# Patient Record
Sex: FEMALE | Race: WHITE | URBAN - METROPOLITAN AREA
[De-identification: names, ages, dates, MRNs, and addresses within clinical notes are randomized per-mention and may not be internally consistent; named-entity substitution may affect disease eponyms.]

---

## 2017-06-09 PROBLEM — Z98.890 STATUS POST BUNIONECTOMY: Status: ACTIVE | Noted: 2017-06-09

## 2017-06-09 PROBLEM — N80.9 ENDOMETRIOSIS: Status: ACTIVE | Noted: 2017-06-09

## 2017-06-09 PROBLEM — S83.209A CURRENT TEAR OF SEMILUNAR CARTILAGE: Status: ACTIVE | Noted: 2017-06-09

## 2017-06-09 RX ORDER — SIMVASTATIN 10 MG
1 TABLET ORAL DAILY
COMMUNITY
Start: 2011-05-24 | End: 2017-12-22

## 2017-06-09 RX ORDER — SIMVASTATIN 10 MG
TABLET ORAL
Qty: 90 TABLET | Refills: 1 | Status: SHIPPED | OUTPATIENT
Start: 2017-06-09 | End: 2017-12-04

## 2017-06-19 ENCOUNTER — TELEPHONE (OUTPATIENT)
Dept: FAMILY MEDICINE CLINIC | Facility: CLINIC | Age: 57
End: 2017-06-19

## 2017-06-19 DIAGNOSIS — E78.5 DYSLIPIDEMIA: Primary | ICD-10-CM

## 2017-06-19 RX ORDER — EZETIMIBE 10 MG/1
10 TABLET ORAL DAILY
COMMUNITY
Start: 2007-06-09 | End: 2017-06-19

## 2017-06-20 RX ORDER — EZETIMIBE 10 MG/1
10 TABLET ORAL DAILY
Qty: 90 TABLET | Refills: 1 | Status: SHIPPED | OUTPATIENT
Start: 2017-06-20 | End: 2017-12-17

## 2017-06-24 ENCOUNTER — APPOINTMENT (OUTPATIENT)
Dept: LAB | Age: 57
End: 2017-06-24
Attending: FAMILY MEDICINE
Payer: COMMERCIAL

## 2017-06-24 DIAGNOSIS — E78.5 DYSLIPIDEMIA: ICD-10-CM

## 2017-06-24 PROCEDURE — 80061 LIPID PANEL: CPT | Performed by: FAMILY MEDICINE

## 2017-06-24 PROCEDURE — 36415 COLL VENOUS BLD VENIPUNCTURE: CPT | Performed by: FAMILY MEDICINE

## 2017-06-24 PROCEDURE — 80053 COMPREHEN METABOLIC PANEL: CPT | Performed by: FAMILY MEDICINE

## 2017-06-26 ENCOUNTER — TELEPHONE (OUTPATIENT)
Dept: FAMILY MEDICINE CLINIC | Facility: CLINIC | Age: 57
End: 2017-06-26

## 2017-06-26 NOTE — TELEPHONE ENCOUNTER
Pt advised of results- verbalized understanding. Pt wanted copies of lab work- they were printed and left at the  for her.

## 2017-06-26 NOTE — TELEPHONE ENCOUNTER
----- Message from Cristal Cordova MD sent at 6/25/2017  8:31 PM CDT -----  Total chol mildly high 223,   HDL excellent 113  Do not recommend change in meds  Continue to watch diet and exercise  May refill meds if needed x 6 months

## 2017-12-04 RX ORDER — ASPIRIN 81 MG/1
1 TABLET ORAL DAILY
COMMUNITY
Start: 2012-10-22

## 2017-12-04 RX ORDER — FERROUS SULFATE 325(65) MG
1 TABLET ORAL DAILY
COMMUNITY
Start: 2007-06-07

## 2017-12-04 RX ORDER — SIMVASTATIN 10 MG
TABLET ORAL
Qty: 90 TABLET | Refills: 0 | Status: SHIPPED | OUTPATIENT
Start: 2017-12-04 | End: 2018-04-11

## 2017-12-04 RX ORDER — LYSINE 500 MG
1 TABLET ORAL AS NEEDED
COMMUNITY
Start: 2006-12-04

## 2017-12-04 NOTE — TELEPHONE ENCOUNTER
Future appt:    Last Appointment:  Visit date not found    Cholesterol, Total (mg/dL)   Date Value   06/24/2017 223 (H)   ----------  HDL Cholesterol (mg/dL)   Date Value   06/24/2017 113   ----------  LDL Cholesterol (mg/dL)   Date Value   06/24/2017 89

## 2017-12-04 NOTE — TELEPHONE ENCOUNTER
Patient informed of refill and appt needed before next refill. Patient states that she called this morning and scheduled an appt.     Future Appointments  Date Time Provider Kendell Wadsworth   12/22/2017 9:30 AM Christiano Jerome MD [facility] [place] [facility]

## 2017-12-17 DIAGNOSIS — E78.5 DYSLIPIDEMIA: ICD-10-CM

## 2017-12-18 RX ORDER — EZETIMIBE 10 MG/1
TABLET ORAL
Qty: 90 TABLET | Refills: 1 | Status: SHIPPED | OUTPATIENT
Start: 2017-12-18 | End: 2018-07-09

## 2017-12-18 NOTE — TELEPHONE ENCOUNTER
Future appt:     Your appointments     Date & Time Appointment Department [facility])    Dec 22, 2017  9:30 AM CST Physical - Established Patient with Benedicto Miller MD [address], [place] [address] [place] [address]        Tracy Valdes

## 2017-12-22 ENCOUNTER — OFFICE VISIT (OUTPATIENT)
Dept: FAMILY MEDICINE CLINIC | Facility: CLINIC | Age: 57
End: 2017-12-22

## 2017-12-22 ENCOUNTER — APPOINTMENT (OUTPATIENT)
Dept: LAB | Age: 57
End: 2017-12-22
Attending: FAMILY MEDICINE
Payer: COMMERCIAL

## 2017-12-22 VITALS
RESPIRATION RATE: 16 BRPM | HEART RATE: 80 BPM | WEIGHT: 157 LBS | BODY MASS INDEX: 29.64 KG/M2 | TEMPERATURE: 99 F | DIASTOLIC BLOOD PRESSURE: 84 MMHG | HEIGHT: 61 IN | SYSTOLIC BLOOD PRESSURE: 124 MMHG

## 2017-12-22 DIAGNOSIS — Z00.00 HEALTH CARE MAINTENANCE: Primary | ICD-10-CM

## 2017-12-22 DIAGNOSIS — M54.31 SCIATICA OF RIGHT SIDE: ICD-10-CM

## 2017-12-22 DIAGNOSIS — E78.5 DYSLIPIDEMIA: ICD-10-CM

## 2017-12-22 PROCEDURE — 80050 GENERAL HEALTH PANEL: CPT | Performed by: FAMILY MEDICINE

## 2017-12-22 PROCEDURE — 88175 CYTOPATH C/V AUTO FLUID REDO: CPT | Performed by: FAMILY MEDICINE

## 2017-12-22 PROCEDURE — 99396 PREV VISIT EST AGE 40-64: CPT | Performed by: FAMILY MEDICINE

## 2017-12-22 PROCEDURE — 80061 LIPID PANEL: CPT | Performed by: FAMILY MEDICINE

## 2017-12-22 PROCEDURE — 36415 COLL VENOUS BLD VENIPUNCTURE: CPT | Performed by: FAMILY MEDICINE

## 2017-12-22 RX ORDER — RIBOFLAVIN (VITAMIN B2) 100 MG
100 TABLET ORAL DAILY
COMMUNITY

## 2017-12-22 RX ORDER — B-COMPLEX WITH VITAMIN C
TABLET ORAL DAILY
COMMUNITY

## 2017-12-22 NOTE — PATIENT INSTRUCTIONS
Await labs  Renew meds as needed  Consider PT for back issues for strengthening and stretching exercises

## 2017-12-23 NOTE — PROGRESS NOTES
[facility] [place]  PROGRESS NOTE  Chief Complaint:   Patient presents with:  Physical: Last pap was 10-22-12; pt is fasting       HPI:   This is a 56year old female coming in for yearly physical.  Patient only chronic medical problem has been -ASSAY, THYROID STIM HORMONE   Result Value Ref Range   TSH 0.887 0.350 - 5.500 mIU/mL   -CBC W/ DIFFERENTIAL   Result Value Ref Range   WBC 5.4 4.0 - 13.0 x10(3) uL   RBC 5.00 3.80 - 5.10 x10(6)uL   HGB 14.6 12.0 - 16.0 g/dL   HCT 45.2 34.0 - 50.0 %   P MOUTH DAILY Disp: 90 tablet Rfl: 1   SIMVASTATIN 10 MG Oral Tab TAKE 1 TABLET BY MOUTH EVERY DAY Disp: 90 tablet Rfl: 0   aspirin (ASPIR-LOW) 81 MG Oral Tab EC Take 1 tablet by mouth daily.  Disp:  Rfl:    Ferrous Sulfate (FERROUSUL) 325 (65 Fe) MG Oral Tab 157 lb. Vital signs reviewed. Appears stated age, well groomed. Physical Exam  GEN:  Patient is alert, awake and oriented, well developed, well nourished, no apparent distress.   HEENT:  Head:  Normocephalic, atraumatic Eyes: EOMI, PERRLA, no scleral icte side    A/P: Patient here for yearly physical and follow-up of her dyslipidemia. Patient recently had a mild back strain is working with a chiropractor. I will give consideration to referral to physical therapy for stretching and strengthening exercises.

## 2017-12-26 ENCOUNTER — TELEPHONE (OUTPATIENT)
Dept: FAMILY MEDICINE CLINIC | Facility: CLINIC | Age: 57
End: 2017-12-26

## 2017-12-26 DIAGNOSIS — E78.49 FAMILIAL MULTIPLE LIPOPROTEIN-TYPE HYPERLIPIDEMIA: ICD-10-CM

## 2017-12-26 DIAGNOSIS — E78.5 DYSLIPIDEMIA: Primary | ICD-10-CM

## 2017-12-26 NOTE — TELEPHONE ENCOUNTER
Patient notified of results and recommendations and expressed understanding.   Orders in for repeat labs in 6 months    Romario Madera, 12/26/17, 9:56 AM

## 2017-12-26 NOTE — TELEPHONE ENCOUNTER
----- Message from April Jensen MD sent at 12/23/2017  7:49 PM CST -----  Labs all good  No changes to meds  Recheck 6 months CMP, lipid

## 2017-12-27 ENCOUNTER — TELEPHONE (OUTPATIENT)
Dept: FAMILY MEDICINE CLINIC | Facility: CLINIC | Age: 57
End: 2017-12-27

## 2017-12-27 NOTE — TELEPHONE ENCOUNTER
----- Message from Danial Gosselin, MD sent at 12/27/2017  1:51 PM CST -----  Pap normal  Repeat 5 years

## 2018-04-11 RX ORDER — SIMVASTATIN 10 MG
TABLET ORAL
Qty: 90 TABLET | Refills: 1 | Status: SHIPPED | OUTPATIENT
Start: 2018-04-11 | End: 2018-10-09

## 2018-04-11 NOTE — TELEPHONE ENCOUNTER
Future appt:    Last Appointment:  12/22/2017    Cholesterol, Total (mg/dL)   Date Value   12/22/2017 180   ----------  HDL Cholesterol (mg/dL)   Date Value   12/22/2017 108   ----------  LDL Cholesterol (mg/dL)   Date Value   12/22/2017 59   ----------  T

## 2018-07-09 DIAGNOSIS — E78.5 DYSLIPIDEMIA: ICD-10-CM

## 2018-07-09 RX ORDER — EZETIMIBE 10 MG/1
TABLET ORAL
Qty: 90 TABLET | Refills: 1 | Status: SHIPPED | OUTPATIENT
Start: 2018-07-09 | End: 2019-01-14

## 2018-07-09 NOTE — TELEPHONE ENCOUNTER
Future appt:     Last Appointment:  12/22/2017; Return in about 6 months (around 6/22/2018).        Cholesterol, Total (mg/dL)   Date Value   12/22/2017 180   ----------  HDL Cholesterol (mg/dL)   Date Value   12/22/2017 108   ----------  LDL Cholesterol (m

## 2018-07-09 NOTE — TELEPHONE ENCOUNTER
Pt informed, appt given.     Future Appointments  Date Time Provider Kendell Wadsworth   7/14/2018 9:30 AM [facility] [place] [facility]

## 2018-07-10 ENCOUNTER — TELEPHONE (OUTPATIENT)
Dept: FAMILY MEDICINE CLINIC | Facility: CLINIC | Age: 58
End: 2018-07-10

## 2018-07-10 NOTE — TELEPHONE ENCOUNTER
Pt states that she has left foot pain that comes and goes. She states that the episodes are starting to last for a longer length of time when they occur. She states that the pain is on the top of the foot. She denies any swelling, redness.   She did elevate

## 2018-07-11 NOTE — TELEPHONE ENCOUNTER
Pt notified and verbalized understanding. Pt offered appt with first available provider but chose to schedule with Dr. Nellie Land. Appt scheduled. Pt advised to call back to be seen sooner if pain increases or any other new sx arise.     Future Appointments  D

## 2018-07-14 ENCOUNTER — APPOINTMENT (OUTPATIENT)
Dept: LAB | Age: 58
End: 2018-07-14
Attending: FAMILY MEDICINE
Payer: COMMERCIAL

## 2018-07-14 DIAGNOSIS — E78.49 FAMILIAL MULTIPLE LIPOPROTEIN-TYPE HYPERLIPIDEMIA: ICD-10-CM

## 2018-07-14 DIAGNOSIS — E78.5 DYSLIPIDEMIA: ICD-10-CM

## 2018-07-14 LAB
ALBUMIN SERPL-MCNC: 3.9 G/DL (ref 3.5–4.8)
ALP LIVER SERPL-CCNC: 83 U/L (ref 46–118)
ALT SERPL-CCNC: 40 U/L (ref 14–54)
AST SERPL-CCNC: 19 U/L (ref 15–41)
BILIRUB SERPL-MCNC: 0.6 MG/DL (ref 0.1–2)
BUN BLD-MCNC: 14 MG/DL (ref 8–20)
CALCIUM BLD-MCNC: 9.3 MG/DL (ref 8.3–10.3)
CHLORIDE: 103 MMOL/L (ref 101–111)
CHOLEST SMN-MCNC: 171 MG/DL (ref ?–200)
CO2: 29 MMOL/L (ref 22–32)
CREAT BLD-MCNC: 0.78 MG/DL (ref 0.55–1.02)
GLUCOSE BLD-MCNC: 96 MG/DL (ref 70–99)
HDLC SERPL-MCNC: 102 MG/DL (ref 45–?)
HDLC SERPL: 1.68 {RATIO} (ref ?–4.44)
LDLC SERPL CALC-MCNC: 50 MG/DL (ref ?–130)
M PROTEIN MFR SERPL ELPH: 7.3 G/DL (ref 6.1–8.3)
NONHDLC SERPL-MCNC: 69 MG/DL (ref ?–130)
POTASSIUM SERPL-SCNC: 4.4 MMOL/L (ref 3.6–5.1)
SODIUM SERPL-SCNC: 138 MMOL/L (ref 136–144)
TRIGL SERPL-MCNC: 96 MG/DL (ref ?–150)
VLDLC SERPL CALC-MCNC: 19 MG/DL (ref 5–40)

## 2018-07-14 PROCEDURE — 36415 COLL VENOUS BLD VENIPUNCTURE: CPT | Performed by: FAMILY MEDICINE

## 2018-07-14 PROCEDURE — 80061 LIPID PANEL: CPT | Performed by: FAMILY MEDICINE

## 2018-07-14 PROCEDURE — 80053 COMPREHEN METABOLIC PANEL: CPT | Performed by: FAMILY MEDICINE

## 2018-07-16 ENCOUNTER — TELEPHONE (OUTPATIENT)
Dept: FAMILY MEDICINE CLINIC | Facility: CLINIC | Age: 58
End: 2018-07-16

## 2018-07-16 NOTE — TELEPHONE ENCOUNTER
----- Message from Shay García MD sent at 7/14/2018  2:58 PM CDT -----  Labs great  Chol under good control  No changes in  meds  Recheck 6 months with physical

## 2018-07-23 ENCOUNTER — OFFICE VISIT (OUTPATIENT)
Dept: FAMILY MEDICINE CLINIC | Facility: CLINIC | Age: 58
End: 2018-07-23
Payer: COMMERCIAL

## 2018-07-23 VITALS
OXYGEN SATURATION: 94 % | SYSTOLIC BLOOD PRESSURE: 112 MMHG | WEIGHT: 166 LBS | BODY MASS INDEX: 31.34 KG/M2 | HEART RATE: 86 BPM | HEIGHT: 61 IN | TEMPERATURE: 99 F | DIASTOLIC BLOOD PRESSURE: 70 MMHG | RESPIRATION RATE: 16 BRPM

## 2018-07-23 DIAGNOSIS — M77.50 TENDONITIS OF ANKLE OR FOOT: Primary | ICD-10-CM

## 2018-07-23 PROBLEM — S83.209A CURRENT TEAR OF SEMILUNAR CARTILAGE: Status: RESOLVED | Noted: 2017-06-09 | Resolved: 2018-07-23

## 2018-07-23 PROBLEM — M54.31 SCIATICA OF RIGHT SIDE: Status: RESOLVED | Noted: 2017-12-22 | Resolved: 2018-07-23

## 2018-07-23 PROBLEM — N80.9 ENDOMETRIOSIS: Status: RESOLVED | Noted: 2017-06-09 | Resolved: 2018-07-23

## 2018-07-23 PROCEDURE — 99214 OFFICE O/P EST MOD 30 MIN: CPT | Performed by: FAMILY MEDICINE

## 2018-07-23 RX ORDER — NAPROXEN 500 MG/1
500 TABLET ORAL 2 TIMES DAILY WITH MEALS
Qty: 30 TABLET | Refills: 1 | Status: SHIPPED | OUTPATIENT
Start: 2018-07-23 | End: 2019-02-18

## 2018-07-23 NOTE — PATIENT INSTRUCTIONS
Out of dress shoes x 2 weeks  Ice to top of foot if pain  DO NOT LACE SHOES tight  Take Naprosen x 2 weeks  If persist will refer to Dr Paloma Farley

## 2018-07-25 PROBLEM — M77.50 TENDONITIS OF ANKLE OR FOOT: Status: ACTIVE | Noted: 2018-07-25

## 2018-07-25 NOTE — PROGRESS NOTES
[address]  PROGRESS NOTE  Chief Complaint:   Patient presents with:   Foot Pain: Pain on top of the left foot      HPI:   This is a 66year old female coming in for evaluation of pain to the top of the left foot for over a months riri mg/dL       Past Medical History:   Diagnosis Date   • Hyperlipidemia      Past Surgical History:  : CORRECT BUNION,SIMPLE Bilateral  No date: D & C  No date: KNEE ARTHROSCOPY Right  No date:   No date: OTHER SURGICAL HISTORY  Social History:  Smok dryness. CARDIOVASCULAR:  Denies chest pain, chest pressure, chest discomfort, palpitations, edema, dyspnea on exertion or at rest.  RESPIRATORY:  Denies shortness of breath, wheezing, cough or sputum.   GASTROINTESTINAL:  Denies abdominal pain, nausea, vo rales/rhonchi/wheezing. CHEST: No tenderness. ABDOMEN:  Soft, nondistended, nontender,, no masses, no hepatosplenomegaly. BACK: No tenderness, no spasm,  . EXTREMITIES: , no cyanosis, no clubbing, FROM, 2+ dorsalis pedis pulses bilaterally. ,  No bruisi Outcome: Patient verbalizes understanding. Patient is notified to call with any questions, complications, allergies, or worsening or changing symptoms. Patient is to call with any side effects or complications from the treatments as a result of today.

## 2018-08-06 ENCOUNTER — TELEPHONE (OUTPATIENT)
Dept: FAMILY MEDICINE CLINIC | Facility: CLINIC | Age: 58
End: 2018-08-06

## 2018-10-09 RX ORDER — SIMVASTATIN 10 MG
TABLET ORAL
Qty: 90 TABLET | Refills: 1 | Status: SHIPPED | OUTPATIENT
Start: 2018-10-09 | End: 2019-04-22

## 2018-10-09 NOTE — TELEPHONE ENCOUNTER
Future appt:    Last Appointment:  7/23/2018; No f/u recommended    Cholesterol, Total (mg/dL)   Date Value   07/14/2018 171     HDL Cholesterol (mg/dL)   Date Value   07/14/2018 102     LDL Cholesterol (mg/dL)   Date Value   07/14/2018 50     Triglyceride

## 2019-01-14 DIAGNOSIS — E78.5 DYSLIPIDEMIA: ICD-10-CM

## 2019-01-14 RX ORDER — EZETIMIBE 10 MG/1
TABLET ORAL
Qty: 90 TABLET | Refills: 1 | Status: SHIPPED | OUTPATIENT
Start: 2019-01-14 | End: 2019-08-21

## 2019-02-18 ENCOUNTER — OFFICE VISIT (OUTPATIENT)
Dept: FAMILY MEDICINE CLINIC | Facility: CLINIC | Age: 59
End: 2019-02-18
Payer: COMMERCIAL

## 2019-02-18 ENCOUNTER — HOSPITAL ENCOUNTER (OUTPATIENT)
Dept: GENERAL RADIOLOGY | Age: 59
Discharge: HOME | End: 2019-02-18
Attending: NURSE PRACTITIONER
Payer: COMMERCIAL

## 2019-02-18 VITALS
BODY MASS INDEX: 30.96 KG/M2 | DIASTOLIC BLOOD PRESSURE: 86 MMHG | HEART RATE: 85 BPM | HEIGHT: 61 IN | SYSTOLIC BLOOD PRESSURE: 132 MMHG | TEMPERATURE: 98 F | WEIGHT: 164 LBS | OXYGEN SATURATION: 98 %

## 2019-02-18 DIAGNOSIS — M67.40 GANGLION CYST: ICD-10-CM

## 2019-02-18 DIAGNOSIS — M79.672 LEFT FOOT PAIN: ICD-10-CM

## 2019-02-18 DIAGNOSIS — M79.672 LEFT FOOT PAIN: Primary | ICD-10-CM

## 2019-02-18 PROCEDURE — 99213 OFFICE O/P EST LOW 20 MIN: CPT | Performed by: NURSE PRACTITIONER

## 2019-02-18 PROCEDURE — 73630 X-RAY EXAM OF FOOT: CPT | Performed by: NURSE PRACTITIONER

## 2019-02-18 NOTE — PROGRESS NOTES
Bev Will is a 64year old female.   Patient presents with:  Cyst      HPI:   Complaints of bump to left foot- Wed (2/13)- unsure if there was any injury    Doesn't hurt to touch- but when she had shoes that were bothering her from rubbing- states ther GI: denies abdominal pain, nausea, vomiting, diarrhea   MUSCULOSKELETAL:  See HPI   NEURO: denies numbness or tingling     EXAM:   /86 (BP Location: Right arm, Patient Position: Sitting, Cuff Size: large)   Pulse 85   Temp 98.2 °F (36.8 °C) (Tympanic Ganglions often form with no symptoms. But the ganglion may put pressure on the nerves and the overlying skin. This can cause tingling, numbness, or pain. Ganglions sometimes swell. Their size can change with different activities or a change in weather.   H Surgery may be needed if a ganglion is causing ongoing or severe pain. The entire ganglion wall is removed during the procedure. Some nearby tissue may also be removed.   After surgery  You may feel pain, swelling, numbness, or tingling for several weeks fo

## 2019-02-18 NOTE — PATIENT INSTRUCTIONS
Follow-up with Dr. Gunner Mora for further evaluation bring your x-rays with you. Ganglion Cyst: Foot  A ganglion is a fluid-filled swelling of the lining of a joint or tendon. Ganglions can form on any part of the foot.  But they most often appear on the ankle o How are ganglions treated? Ganglions may be hard to treat without surgery. But nonsurgical methods may be helpful in relieving some of your symptoms. Nonsurgical care  · Pads placed around the ganglion can ease pressure and friction.   · Fluid removal may

## 2019-04-22 RX ORDER — SIMVASTATIN 10 MG
TABLET ORAL
Qty: 90 TABLET | Refills: 1 | Status: SHIPPED | OUTPATIENT
Start: 2019-04-22 | End: 2019-10-17

## 2019-04-22 NOTE — TELEPHONE ENCOUNTER
Future appt:     Your appointments     Date & Time Appointment Department [facility])    Apr 26, 2019  3:00 PM CDT Presurgical with Luis Holder MD [address] [facility], [facility],

## 2019-04-26 ENCOUNTER — OFFICE VISIT (OUTPATIENT)
Dept: FAMILY MEDICINE CLINIC | Facility: CLINIC | Age: 59
End: 2019-04-26
Payer: COMMERCIAL

## 2019-04-26 VITALS
HEIGHT: 61 IN | WEIGHT: 162.38 LBS | DIASTOLIC BLOOD PRESSURE: 72 MMHG | TEMPERATURE: 99 F | OXYGEN SATURATION: 96 % | RESPIRATION RATE: 18 BRPM | HEART RATE: 92 BPM | SYSTOLIC BLOOD PRESSURE: 138 MMHG | BODY MASS INDEX: 30.66 KG/M2

## 2019-04-26 DIAGNOSIS — M67.40 GANGLION CYST: Primary | ICD-10-CM

## 2019-04-26 DIAGNOSIS — Z01.818 PREOP EXAMINATION: ICD-10-CM

## 2019-04-26 PROCEDURE — 93000 ELECTROCARDIOGRAM COMPLETE: CPT | Performed by: FAMILY MEDICINE

## 2019-04-26 PROCEDURE — 80053 COMPREHEN METABOLIC PANEL: CPT | Performed by: FAMILY MEDICINE

## 2019-04-26 PROCEDURE — 99243 OFF/OP CNSLTJ NEW/EST LOW 30: CPT | Performed by: FAMILY MEDICINE

## 2019-04-26 PROCEDURE — 85025 COMPLETE CBC W/AUTO DIFF WBC: CPT | Performed by: FAMILY MEDICINE

## 2019-04-26 NOTE — PROGRESS NOTES
[facility] [place]  PROGRESS NOTE  Chief Complaint:   Patient presents with:  Pre-Op Exam: 5/14 L foot surgery      HPI:   This is a 64year old female coming in for preoperative clearance for upcoming left foot surgery on the 14th to remove a c vitamin supplement but can stop these for the surgery. Patient's past medical history is notable for difficult with elevated cholesterol which is the only prescribed medications that she takes.   Patient does have some mild seasonal allergies but it is a Comment:Some red dyes in food.   Current Meds:    Current Outpatient Medications:  SIMVASTATIN 10 MG Oral Tab TAKE 1 TABLET BY MOUTH EVERY DAY Disp: 90 tablet Rfl: 1   EZETIMIBE 10 MG Oral Tab TAKE 1 TABLET(10 MG) BY MOUTH DAILY Disp: 90 tablet Rfl: 1   Mul hives, eczema or rhinitis.      EXAM:   /72 (BP Location: Left arm, Patient Position: Sitting, Cuff Size: adult)   Pulse 92   Temp 98.5 °F (36.9 °C) (Temporal)   Resp 18   Ht 61\"   Wt 162 lb 6.4 oz   SpO2 96%   BMI 30.69 kg/m²  Estimated body mass in CBC WITH DIFFERENTIAL WITH PLATELET; Future  -     COMP METABOLIC PANEL (14);  Future  -     CBC WITH DIFFERENTIAL WITH PLATELET  -     COMP METABOLIC PANEL (14)  -     CBC W/ DIFFERENTIAL    Preop examination  -     ELECTROCARDIOGRAM, COMPLETE  -     CBC W MD  4/26/2019  4:49 PM    This note was created utilizing Dragon speech recognition software.  Please excuse any grammatical errors. Call my office if you have any questions regarding this note.

## 2019-04-26 NOTE — PATIENT INSTRUCTIONS
CLEAR FOR SURGERY  No Advil, ibuprofen, aspirin, Aleve or Motrin for 1 week before surgery  Stop all viatmins, herbal supplements  For 1 week before surgery

## 2019-04-29 ENCOUNTER — TELEPHONE (OUTPATIENT)
Dept: FAMILY MEDICINE CLINIC | Facility: CLINIC | Age: 59
End: 2019-04-29

## 2019-04-29 NOTE — TELEPHONE ENCOUNTER
Informed pt of her blood work results. Informed pt that she is cleared for surgery    Faxed paperwork to [employer].

## 2019-04-29 NOTE — TELEPHONE ENCOUNTER
----- Message from Danial Gosselin, MD sent at 4/28/2019  5:35 PM CDT -----  Labs great, was not fasting  CLEAR FOR SURGERY  Please forward labs, EKG and preop visit for surgery

## 2019-05-10 ENCOUNTER — TELEPHONE (OUTPATIENT)
Dept: FAMILY MEDICINE CLINIC | Facility: CLINIC | Age: 59
End: 2019-05-10

## 2019-08-21 DIAGNOSIS — E78.5 DYSLIPIDEMIA: ICD-10-CM

## 2019-08-21 RX ORDER — EZETIMIBE 10 MG/1
TABLET ORAL
Qty: 90 TABLET | Refills: 1 | Status: SHIPPED | OUTPATIENT
Start: 2019-08-21 | End: 2020-03-04

## 2019-08-21 NOTE — TELEPHONE ENCOUNTER
Future appt:    Last Appointment with provider:   4/26/2019; No f/u recommended    Last appointment at [facility]:  4/26/2019  Cholesterol, Total (mg/dL)   Date Value   07/14/2018 171     HDL Cholesterol (mg/dL)   Date Value   07/14/2018 102     LDL Chol

## 2019-08-26 ENCOUNTER — TELEPHONE (OUTPATIENT)
Dept: FAMILY MEDICINE CLINIC | Facility: CLINIC | Age: 59
End: 2019-08-26

## 2019-08-26 DIAGNOSIS — Z00.00 HEALTH CARE MAINTENANCE: ICD-10-CM

## 2019-08-26 DIAGNOSIS — E78.49 FAMILIAL MULTIPLE LIPOPROTEIN-TYPE HYPERLIPIDEMIA: Primary | ICD-10-CM

## 2019-08-26 NOTE — TELEPHONE ENCOUNTER
Patient requested an appt with Dr Samuel Valderrama for a px and also to discuss the switch of pcp.  Informed patient that Dr Samuel Valderrama is fully booked and offered a different Dr. Patient states she wants to discuss that with Dr Samuel Valderrama and requested a call back from n

## 2019-08-27 NOTE — TELEPHONE ENCOUNTER
Pt would like to schedule px with pap and discuss which pcp would be the best fit for her. There are no 45 minute appts available but some 30 minute appts. Please advise if we can fit pt in.

## 2019-08-27 NOTE — TELEPHONE ENCOUNTER
Left detailed message pt has an appt on 9/13 at 3:00. Also instructed pt to have her blood work done before her appt orders have been placed.

## 2019-08-29 ENCOUNTER — TELEPHONE (OUTPATIENT)
Dept: FAMILY MEDICINE CLINIC | Facility: CLINIC | Age: 59
End: 2019-08-29

## 2019-08-29 NOTE — TELEPHONE ENCOUNTER
Left message for pt to call the office in regards to the appt for her physical and a possible earlier date.

## 2019-08-30 ENCOUNTER — OFFICE VISIT (OUTPATIENT)
Dept: FAMILY MEDICINE CLINIC | Facility: CLINIC | Age: 59
End: 2019-08-30
Payer: COMMERCIAL

## 2019-08-30 VITALS
SYSTOLIC BLOOD PRESSURE: 128 MMHG | HEIGHT: 61 IN | RESPIRATION RATE: 16 BRPM | BODY MASS INDEX: 31.34 KG/M2 | DIASTOLIC BLOOD PRESSURE: 78 MMHG | OXYGEN SATURATION: 98 % | WEIGHT: 166 LBS | HEART RATE: 84 BPM | TEMPERATURE: 98 F

## 2019-08-30 DIAGNOSIS — E78.5 DYSLIPIDEMIA: ICD-10-CM

## 2019-08-30 DIAGNOSIS — Z00.00 HEALTH CARE MAINTENANCE: Primary | ICD-10-CM

## 2019-08-30 PROCEDURE — 99396 PREV VISIT EST AGE 40-64: CPT | Performed by: FAMILY MEDICINE

## 2019-08-30 NOTE — PATIENT INSTRUCTIONS
Keep fasting lab appt, 12 hours water only  Keep mammogram appt  Will need f/u for chol in 6 months  Continue ointement to foot scar x 2 months

## 2019-08-31 NOTE — PROGRESS NOTES
[address]  PROGRESS NOTE  Chief Complaint:   Patient presents with: Well Adult      HPI:   This is a 64year old female coming in for yearly wellness visit.   Patient has chronic medical problem of dyslipidemia which takes cholesterol m RBC 5.19 3.80 - 5.30 x10(6)uL    HGB 14.9 12.0 - 16.0 g/dL    HCT 47.2 35.0 - 48.0 %    .0 150.0 - 450.0 10(3)uL    MCV 90.9 80.0 - 100.0 fL    MCH 28.7 26.0 - 34.0 pg    MCHC 31.6 31.0 - 37.0 g/dL    RDW 13.1 11.0 - 15.0 %    RDW-SD 43.4 35.1 - 46. Take 100 mg by mouth daily. Disp:  Rfl:    Zinc 100 MG Oral Tab Take by mouth daily. Disp:  Rfl:    aspirin (ASPIR-LOW) 81 MG Oral Tab EC Take 1 tablet by mouth daily.  Disp:  Rfl:    Ferrous Sulfate (FERROUSUL) 325 (65 Fe) MG Oral Tab Take 1 tablet by mout Height as of this encounter: 61\". Weight as of this encounter: 166 lb. Vital signs reviewed. Appears stated age, well groomed. Physical Exam:  GEN:  Patient is alert, awake and oriented, well developed, well nourished, no apparent distress.   HEENT: Education: There are no barriers to learning. Medical education done. Outcome: Patient verbalizes understanding. Patient is notified to call with any questions, complications, allergies, or worsening or changing symptoms.   Patient is to call with any ramon

## 2019-09-07 ENCOUNTER — LABORATORY ENCOUNTER (OUTPATIENT)
Dept: LAB | Age: 59
End: 2019-09-07
Attending: FAMILY MEDICINE
Payer: COMMERCIAL

## 2019-09-07 DIAGNOSIS — E78.49 FAMILIAL MULTIPLE LIPOPROTEIN-TYPE HYPERLIPIDEMIA: ICD-10-CM

## 2019-09-07 DIAGNOSIS — Z00.00 HEALTH CARE MAINTENANCE: ICD-10-CM

## 2019-09-07 LAB
ALBUMIN SERPL-MCNC: 4 G/DL (ref 3.4–5)
ALBUMIN/GLOB SERPL: 1.1 {RATIO} (ref 1–2)
ALP LIVER SERPL-CCNC: 80 U/L (ref 46–118)
ALT SERPL-CCNC: 41 U/L (ref 13–56)
ANION GAP SERPL CALC-SCNC: 7 MMOL/L (ref 0–18)
AST SERPL-CCNC: 21 U/L (ref 15–37)
BASOPHILS # BLD AUTO: 0.04 X10(3) UL (ref 0–0.2)
BASOPHILS NFR BLD AUTO: 0.7 %
BILIRUB SERPL-MCNC: 0.5 MG/DL (ref 0.1–2)
BUN BLD-MCNC: 15 MG/DL (ref 7–18)
BUN/CREAT SERPL: 17.9 (ref 10–20)
CALCIUM BLD-MCNC: 9.1 MG/DL (ref 8.5–10.1)
CHLORIDE SERPL-SCNC: 103 MMOL/L (ref 98–112)
CHOLEST SMN-MCNC: 192 MG/DL (ref ?–200)
CO2 SERPL-SCNC: 28 MMOL/L (ref 21–32)
CREAT BLD-MCNC: 0.84 MG/DL (ref 0.55–1.02)
DEPRECATED RDW RBC AUTO: 42.6 FL (ref 35.1–46.3)
EOSINOPHIL # BLD AUTO: 0.14 X10(3) UL (ref 0–0.7)
EOSINOPHIL NFR BLD AUTO: 2.5 %
ERYTHROCYTE [DISTWIDTH] IN BLOOD BY AUTOMATED COUNT: 12.6 % (ref 11–15)
GLOBULIN PLAS-MCNC: 3.7 G/DL (ref 2.8–4.4)
GLUCOSE BLD-MCNC: 99 MG/DL (ref 70–99)
HCT VFR BLD AUTO: 46.2 % (ref 35–48)
HDLC SERPL-MCNC: 96 MG/DL (ref 40–59)
HGB BLD-MCNC: 14.5 G/DL (ref 12–16)
IMM GRANULOCYTES # BLD AUTO: 0.02 X10(3) UL (ref 0–1)
IMM GRANULOCYTES NFR BLD: 0.4 %
LDLC SERPL CALC-MCNC: 77 MG/DL (ref ?–100)
LYMPHOCYTES # BLD AUTO: 2.04 X10(3) UL (ref 1–4)
LYMPHOCYTES NFR BLD AUTO: 36 %
M PROTEIN MFR SERPL ELPH: 7.7 G/DL (ref 6.4–8.2)
MCH RBC QN AUTO: 28.9 PG (ref 26–34)
MCHC RBC AUTO-ENTMCNC: 31.4 G/DL (ref 31–37)
MCV RBC AUTO: 92 FL (ref 80–100)
MONOCYTES # BLD AUTO: 0.37 X10(3) UL (ref 0.1–1)
MONOCYTES NFR BLD AUTO: 6.5 %
NEUTROPHILS # BLD AUTO: 3.05 X10 (3) UL (ref 1.5–7.7)
NEUTROPHILS # BLD AUTO: 3.05 X10(3) UL (ref 1.5–7.7)
NEUTROPHILS NFR BLD AUTO: 53.9 %
NONHDLC SERPL-MCNC: 96 MG/DL (ref ?–130)
OSMOLALITY SERPL CALC.SUM OF ELEC: 287 MOSM/KG (ref 275–295)
PLATELET # BLD AUTO: 312 10(3)UL (ref 150–450)
POTASSIUM SERPL-SCNC: 4.4 MMOL/L (ref 3.5–5.1)
RBC # BLD AUTO: 5.02 X10(6)UL (ref 3.8–5.3)
SODIUM SERPL-SCNC: 138 MMOL/L (ref 136–145)
TRIGL SERPL-MCNC: 93 MG/DL (ref 30–149)
TSI SER-ACNC: 1.56 MIU/ML (ref 0.36–3.74)
VLDLC SERPL CALC-MCNC: 19 MG/DL (ref 0–30)
WBC # BLD AUTO: 5.7 X10(3) UL (ref 4–11)

## 2019-09-07 PROCEDURE — 80050 GENERAL HEALTH PANEL: CPT | Performed by: FAMILY MEDICINE

## 2019-09-07 PROCEDURE — 80061 LIPID PANEL: CPT | Performed by: FAMILY MEDICINE

## 2019-09-07 PROCEDURE — 36415 COLL VENOUS BLD VENIPUNCTURE: CPT | Performed by: FAMILY MEDICINE

## 2019-09-09 ENCOUNTER — TELEPHONE (OUTPATIENT)
Dept: FAMILY MEDICINE CLINIC | Facility: CLINIC | Age: 59
End: 2019-09-09

## 2019-09-09 DIAGNOSIS — E78.49 FAMILIAL MULTIPLE LIPOPROTEIN-TYPE HYPERLIPIDEMIA: Primary | ICD-10-CM

## 2019-09-09 NOTE — TELEPHONE ENCOUNTER
----- Message from Omaira Hedrick MD sent at 9/7/2019  6:43 PM CDT -----  Labs great  No changes in meds  Continue walking  Recheck 6 months

## 2019-09-09 NOTE — TELEPHONE ENCOUNTER
Pt informed. Pt asked if Dr. Yasir Briceño would place lab orders for follow up in 6 months. Pt understands she will need to get established with new provider.

## 2019-10-17 RX ORDER — SIMVASTATIN 10 MG
10 TABLET ORAL
Qty: 90 TABLET | Refills: 1 | Status: SHIPPED | OUTPATIENT
Start: 2019-10-17 | End: 2020-04-13

## 2020-02-26 DIAGNOSIS — E78.5 DYSLIPIDEMIA: ICD-10-CM

## 2020-02-26 RX ORDER — EZETIMIBE 10 MG/1
TABLET ORAL
Qty: 90 TABLET | Refills: 1 | OUTPATIENT
Start: 2020-02-26

## 2020-02-26 NOTE — TELEPHONE ENCOUNTER
Future appt:    Last Appointment with provider:   Visit date not found  Last appointment at [facility]:  8/30/2019  Cholesterol, Total (mg/dL)   Date Value   09/07/2019 192     HDL Cholesterol (mg/dL)   Date Value   09/07/2019 96 (H)     LDL Cholesterol

## 2020-03-04 ENCOUNTER — TELEPHONE (OUTPATIENT)
Dept: FAMILY MEDICINE CLINIC | Facility: CLINIC | Age: 60
End: 2020-03-04

## 2020-03-04 DIAGNOSIS — E78.5 DYSLIPIDEMIA: ICD-10-CM

## 2020-03-04 RX ORDER — EZETIMIBE 10 MG/1
TABLET ORAL
Qty: 90 TABLET | Refills: 1 | Status: SHIPPED | OUTPATIENT
Start: 2020-03-04 | End: 2020-09-09

## 2020-03-04 NOTE — TELEPHONE ENCOUNTER
Future appt:    Last Appointment with provider:   Visit date not found  Last appointment at [facility]:  Visit date not found  Cholesterol, Total (mg/dL)   Date Value   09/07/2019 192     HDL Cholesterol (mg/dL)   Date Value   09/07/2019 96 (H)     LDL C

## 2020-03-04 NOTE — TELEPHONE ENCOUNTER
Future Appointments   Date Time Provider Kendell Wadsworth   4/8/2020  6:00 PM Michael Gonsalves MD [facility] [place] [facility] [place] [facility]

## 2020-04-13 RX ORDER — SIMVASTATIN 10 MG
TABLET ORAL
Qty: 90 TABLET | Refills: 1 | Status: SHIPPED | OUTPATIENT
Start: 2020-04-13 | End: 2020-10-14

## 2020-04-13 NOTE — TELEPHONE ENCOUNTER
Future appt:     Your appointments     Date & Time Appointment Department [facility])    Jun 03, 2020  6:00 PM CDT Exam - Established with Shivam Newby MD [facility] [place] [facility])            [employer]

## 2020-06-10 NOTE — PATIENT INSTRUCTIONS
Good exam       Ok for refills as needed. Discussed stress reducers. Consider appointment with counselor - Julissa Mehta in 6 months.

## 2020-06-10 NOTE — PROGRESS NOTES
Chief Complaint:   Patient presents with:  Establish Care: former   Anxiety: chest tightness      HPI:   This is a 64year old female coming in for getting established.      A lot of stresses recently   Son dx testicular cancer     Patient with ep Absolute 3.05 1.50 - 7.70 x10(3) uL    Lymphocyte Absolute 2.04 1.00 - 4.00 x10(3) uL    Monocyte Absolute 0.37 0.10 - 1.00 x10(3) uL    Eosinophil Absolute 0.14 0.00 - 0.70 x10(3) uL    Basophil Absolute 0.04 0.00 - 0.20 x10(3) uL    Immature Granulocyte • Ferrous Sulfate (FERROUSUL) 325 (65 Fe) MG Oral Tab Take 1 tablet by mouth daily. • L-Lysine 500 MG Oral Tab Take 1 tablet by mouth as needed. Allergies:    Red Dye                 ANAPHYLAXIS    Comment:Some red dyes in food.        REVI Clear to auscultation bilterally, no rales/rhonchi/wheezing. ABDOMEN:  Soft, nondistended, nontender, bowel sounds normal in all 4 quadrants, no masses, no hepatosplenomegaly. BACK: No tenderness, no spasm, SLR test negative, FROM.   EXTREMITIES:  No ed

## 2020-06-12 PROBLEM — R07.89 ATYPICAL CHEST PAIN: Status: ACTIVE | Noted: 2020-06-12

## 2020-09-05 DIAGNOSIS — E78.5 DYSLIPIDEMIA: ICD-10-CM

## 2020-09-09 RX ORDER — EZETIMIBE 10 MG/1
TABLET ORAL
Qty: 90 TABLET | Refills: 1 | Status: SHIPPED | OUTPATIENT
Start: 2020-09-09 | End: 2021-03-03

## 2020-09-09 NOTE — TELEPHONE ENCOUNTER
Future appt:    Last Appointment with provider:   6/10/2020  Last appointment at [facility]:  6/10/2020  Cholesterol, Total (mg/dL)   Date Value   09/07/2019 192     HDL Cholesterol (mg/dL)   Date Value   09/07/2019 96 (H)     LDL Cholesterol (mg/dL)   D

## 2020-10-13 NOTE — TELEPHONE ENCOUNTER
Future appt:    Last Appointment with provider:   6/10/2020  Last appointment at [facility]:  6/10/2020-anxiety    SIMVASTATIN 10 MG Oral Tab-TAKE 1 TABLET(10 MG) BY MOUTH EVERY DAY    Last filled on 04/13/2020- #90 with 1 refill.    Cholesterol, Total (m

## 2020-10-14 RX ORDER — SIMVASTATIN 10 MG
TABLET ORAL
Qty: 90 TABLET | Refills: 0 | Status: SHIPPED | OUTPATIENT
Start: 2020-10-14 | End: 2020-12-02

## 2020-12-02 ENCOUNTER — OFFICE VISIT (OUTPATIENT)
Dept: FAMILY MEDICINE CLINIC | Facility: CLINIC | Age: 60
End: 2020-12-02
Payer: COMMERCIAL

## 2020-12-02 VITALS
TEMPERATURE: 97 F | OXYGEN SATURATION: 96 % | HEIGHT: 61 IN | DIASTOLIC BLOOD PRESSURE: 70 MMHG | RESPIRATION RATE: 18 BRPM | HEART RATE: 91 BPM | BODY MASS INDEX: 30.4 KG/M2 | WEIGHT: 161 LBS | SYSTOLIC BLOOD PRESSURE: 130 MMHG

## 2020-12-02 DIAGNOSIS — Z00.00 HEALTH CARE MAINTENANCE: Primary | ICD-10-CM

## 2020-12-02 DIAGNOSIS — E78.5 DYSLIPIDEMIA: ICD-10-CM

## 2020-12-02 DIAGNOSIS — M25.521 RIGHT ELBOW PAIN: ICD-10-CM

## 2020-12-02 DIAGNOSIS — Z23 INFLUENZA VACCINE NEEDED: ICD-10-CM

## 2020-12-02 DIAGNOSIS — E61.1 IRON DEFICIENCY: ICD-10-CM

## 2020-12-02 PROBLEM — M77.50 TENDONITIS OF ANKLE OR FOOT: Status: RESOLVED | Noted: 2018-07-25 | Resolved: 2020-12-02

## 2020-12-02 PROCEDURE — 3075F SYST BP GE 130 - 139MM HG: CPT | Performed by: NURSE PRACTITIONER

## 2020-12-02 PROCEDURE — 3008F BODY MASS INDEX DOCD: CPT | Performed by: NURSE PRACTITIONER

## 2020-12-02 PROCEDURE — 90471 IMMUNIZATION ADMIN: CPT | Performed by: NURSE PRACTITIONER

## 2020-12-02 PROCEDURE — 3078F DIAST BP <80 MM HG: CPT | Performed by: NURSE PRACTITIONER

## 2020-12-02 PROCEDURE — 90686 IIV4 VACC NO PRSV 0.5 ML IM: CPT | Performed by: NURSE PRACTITIONER

## 2020-12-02 PROCEDURE — 99396 PREV VISIT EST AGE 40-64: CPT | Performed by: NURSE PRACTITIONER

## 2020-12-02 RX ORDER — SIMVASTATIN 10 MG
10 TABLET ORAL DAILY
Qty: 90 TABLET | Refills: 1 | Status: SHIPPED | OUTPATIENT
Start: 2020-12-02 | End: 2021-07-16

## 2020-12-03 NOTE — PATIENT INSTRUCTIONS
Good exam today. Fasting labs in the next week. Flu shot today. Wellness exam in one year. Mammogram next summer. Check with your insurance company regarding Shingles vaccine (Shingrix) coverage.     Right elbow:  Aleve 2 tablets in the morning and 1

## 2020-12-03 NOTE — PROGRESS NOTES
[facility] [place]    HPI:     Chayo Vail is a 64year old female who presents for an Annual Health Visit. Here for annual physical.  Had mammogram in September with 1 year follow up indicated. Is , is sexually active.   Carol Perez Date   • Hyperlipidemia       Past Surgical History:   Procedure Laterality Date   • CORRECT BUNION,SIMPLE Bilateral    • D & C     • KNEE ARTHROSCOPY Right    •      • OTHER SURGICAL HISTORY Left     bunion   • OTHER SURGICAL HISTORY Left    • OTH kg)  06/10/20 : 161 lb 6.4 oz (73.2 kg)  08/30/19 : 166 lb (75.3 kg)  04/26/19 : 162 lb 6.4 oz (73.7 kg)  02/18/19 : 164 lb (74.4 kg)  07/23/18 : 166 lb (75.3 kg)    Estimated body mass index is 30.42 kg/m² as calculated from the following:    Height as of location. Wellness exam in 1 year. -     CBC WITH DIFFERENTIAL WITH PLATELET; Future  -     COMP METABOLIC PANEL (14); Future  -     LIPID PANEL; Future  -     TSH W REFLEX TO FREE T4; Future  -     IRON AND TIBC;  Future    Influenza vaccine needed  Flu Status post bunionectomy     Familial multiple lipoprotein-type hyperlipidemia     Esophageal reflux     Dyslipidemia     Tendonitis of ankle or foot     Atypical chest pain      Rosales Hickman, APRN  12/2/2020  6:10 PM    This note was created utilizing [employer]YAZ

## 2021-01-19 DIAGNOSIS — E78.5 DYSLIPIDEMIA: ICD-10-CM

## 2021-01-19 RX ORDER — SIMVASTATIN 10 MG
TABLET ORAL
Qty: 90 TABLET | Refills: 1 | OUTPATIENT
Start: 2021-01-19

## 2021-01-19 NOTE — TELEPHONE ENCOUNTER
Simvastatin was sent for #90 with 1 refill Dec. 2 2020. Refill request denied with the above note back to pharmacy.

## 2021-01-25 ENCOUNTER — TELEPHONE (OUTPATIENT)
Dept: FAMILY MEDICINE CLINIC | Facility: CLINIC | Age: 61
End: 2021-01-25

## 2021-01-25 NOTE — TELEPHONE ENCOUNTER
[facility] on 12/2/2020. Patient states when she called [facility] they told her she had no more refills.  I asked patient if she ever picked up the initial 90 day supply this pa

## 2021-01-30 ENCOUNTER — LAB ENCOUNTER (OUTPATIENT)
Dept: LAB | Age: 61
End: 2021-01-30
Attending: FAMILY MEDICINE
Payer: COMMERCIAL

## 2021-01-30 DIAGNOSIS — E78.5 DYSLIPIDEMIA: ICD-10-CM

## 2021-01-30 DIAGNOSIS — E61.1 IRON DEFICIENCY: ICD-10-CM

## 2021-01-30 DIAGNOSIS — Z00.00 HEALTH CARE MAINTENANCE: ICD-10-CM

## 2021-01-30 LAB
ALBUMIN SERPL-MCNC: 3.9 G/DL (ref 3.4–5)
ALBUMIN/GLOB SERPL: 1.1 {RATIO} (ref 1–2)
ALP LIVER SERPL-CCNC: 82 U/L
ALT SERPL-CCNC: 59 U/L
ANION GAP SERPL CALC-SCNC: 5 MMOL/L (ref 0–18)
AST SERPL-CCNC: 20 U/L (ref 15–37)
BASOPHILS # BLD AUTO: 0.08 X10(3) UL (ref 0–0.2)
BASOPHILS NFR BLD AUTO: 1.3 %
BILIRUB SERPL-MCNC: 0.5 MG/DL (ref 0.1–2)
BUN BLD-MCNC: 15 MG/DL (ref 7–18)
BUN/CREAT SERPL: 20.3 (ref 10–20)
CALCIUM BLD-MCNC: 9.5 MG/DL (ref 8.5–10.1)
CHLORIDE SERPL-SCNC: 104 MMOL/L (ref 98–112)
CHOLEST SMN-MCNC: 192 MG/DL (ref ?–200)
CO2 SERPL-SCNC: 30 MMOL/L (ref 21–32)
CREAT BLD-MCNC: 0.74 MG/DL
DEPRECATED RDW RBC AUTO: 42.3 FL (ref 35.1–46.3)
EOSINOPHIL # BLD AUTO: 0.18 X10(3) UL (ref 0–0.7)
EOSINOPHIL NFR BLD AUTO: 2.9 %
ERYTHROCYTE [DISTWIDTH] IN BLOOD BY AUTOMATED COUNT: 12.3 % (ref 11–15)
GLOBULIN PLAS-MCNC: 3.7 G/DL (ref 2.8–4.4)
GLUCOSE BLD-MCNC: 97 MG/DL (ref 70–99)
HCT VFR BLD AUTO: 47 %
HDLC SERPL-MCNC: 113 MG/DL (ref 40–59)
HGB BLD-MCNC: 15.1 G/DL
IMM GRANULOCYTES # BLD AUTO: 0.02 X10(3) UL (ref 0–1)
IMM GRANULOCYTES NFR BLD: 0.3 %
IRON SATURATION: 23 %
IRON SERPL-MCNC: 102 UG/DL
LDLC SERPL CALC-MCNC: 62 MG/DL (ref ?–100)
LYMPHOCYTES # BLD AUTO: 2.12 X10(3) UL (ref 1–4)
LYMPHOCYTES NFR BLD AUTO: 33.6 %
M PROTEIN MFR SERPL ELPH: 7.6 G/DL (ref 6.4–8.2)
MCH RBC QN AUTO: 29.7 PG (ref 26–34)
MCHC RBC AUTO-ENTMCNC: 32.1 G/DL (ref 31–37)
MCV RBC AUTO: 92.5 FL
MONOCYTES # BLD AUTO: 0.45 X10(3) UL (ref 0.1–1)
MONOCYTES NFR BLD AUTO: 7.1 %
NEUTROPHILS # BLD AUTO: 3.46 X10 (3) UL (ref 1.5–7.7)
NEUTROPHILS # BLD AUTO: 3.46 X10(3) UL (ref 1.5–7.7)
NEUTROPHILS NFR BLD AUTO: 54.8 %
NONHDLC SERPL-MCNC: 79 MG/DL (ref ?–130)
OSMOLALITY SERPL CALC.SUM OF ELEC: 289 MOSM/KG (ref 275–295)
PATIENT FASTING Y/N/NP: YES
PATIENT FASTING Y/N/NP: YES
PLATELET # BLD AUTO: 331 10(3)UL (ref 150–450)
POTASSIUM SERPL-SCNC: 4.4 MMOL/L (ref 3.5–5.1)
RBC # BLD AUTO: 5.08 X10(6)UL
SODIUM SERPL-SCNC: 139 MMOL/L (ref 136–145)
TOTAL IRON BINDING CAPACITY: 435 UG/DL (ref 240–450)
TRANSFERRIN SERPL-MCNC: 292 MG/DL (ref 200–360)
TRIGL SERPL-MCNC: 84 MG/DL (ref 30–149)
TSI SER-ACNC: 1.36 MIU/ML (ref 0.36–3.74)
VLDLC SERPL CALC-MCNC: 17 MG/DL (ref 0–30)
WBC # BLD AUTO: 6.3 X10(3) UL (ref 4–11)

## 2021-01-30 PROCEDURE — 83540 ASSAY OF IRON: CPT | Performed by: NURSE PRACTITIONER

## 2021-01-30 PROCEDURE — 83550 IRON BINDING TEST: CPT | Performed by: NURSE PRACTITIONER

## 2021-01-30 PROCEDURE — 36415 COLL VENOUS BLD VENIPUNCTURE: CPT | Performed by: NURSE PRACTITIONER

## 2021-01-30 PROCEDURE — 80061 LIPID PANEL: CPT | Performed by: NURSE PRACTITIONER

## 2021-01-30 PROCEDURE — 80050 GENERAL HEALTH PANEL: CPT | Performed by: NURSE PRACTITIONER

## 2021-02-01 ENCOUNTER — TELEPHONE (OUTPATIENT)
Dept: FAMILY MEDICINE CLINIC | Facility: CLINIC | Age: 61
End: 2021-02-01

## 2021-02-01 NOTE — TELEPHONE ENCOUNTER
----- Message from LIZZ Jones sent at 2/1/2021  9:26 AM CST -----  LIZZ Lorenzana, is out of the office today. Please let patient know that her cholesterol levels are excellent. Her iron levels are in normal range as well as her thyroid.

## 2021-03-02 DIAGNOSIS — E78.5 DYSLIPIDEMIA: ICD-10-CM

## 2021-03-02 NOTE — TELEPHONE ENCOUNTER
Future appt:    Last Appointment with provider:   Visit date not found  Last appointment at [facility] [place]:  12/2/2020 physical with Achilles Hilding; recheck 1 year  Cholesterol, Total (mg/dL)   Date Value   01/30/2021 192     HDL Cholesterol (mg/dL)   Date [facility]

## 2021-03-03 RX ORDER — EZETIMIBE 10 MG/1
TABLET ORAL
Qty: 90 TABLET | Refills: 3 | Status: SHIPPED | OUTPATIENT
Start: 2021-03-03 | End: 2022-02-01

## 2021-05-03 ENCOUNTER — TELEPHONE (OUTPATIENT)
Dept: FAMILY MEDICINE CLINIC | Facility: CLINIC | Age: 61
End: 2021-05-03

## 2021-05-03 ENCOUNTER — OFFICE VISIT (OUTPATIENT)
Dept: FAMILY MEDICINE CLINIC | Facility: CLINIC | Age: 61
End: 2021-05-03
Payer: COMMERCIAL

## 2021-05-03 VITALS
BODY MASS INDEX: 31 KG/M2 | DIASTOLIC BLOOD PRESSURE: 94 MMHG | SYSTOLIC BLOOD PRESSURE: 130 MMHG | HEART RATE: 80 BPM | TEMPERATURE: 99 F | WEIGHT: 164 LBS | RESPIRATION RATE: 12 BRPM

## 2021-05-03 DIAGNOSIS — L03.114 CELLULITIS OF LEFT UPPER EXTREMITY: Primary | ICD-10-CM

## 2021-05-03 PROCEDURE — 3075F SYST BP GE 130 - 139MM HG: CPT | Performed by: FAMILY MEDICINE

## 2021-05-03 PROCEDURE — 3080F DIAST BP >= 90 MM HG: CPT | Performed by: FAMILY MEDICINE

## 2021-05-03 PROCEDURE — 99214 OFFICE O/P EST MOD 30 MIN: CPT | Performed by: FAMILY MEDICINE

## 2021-05-03 RX ORDER — DOXYCYCLINE HYCLATE 100 MG/1
100 CAPSULE ORAL 2 TIMES DAILY
Qty: 20 CAPSULE | Refills: 0 | Status: SHIPPED | OUTPATIENT
Start: 2021-05-03 | End: 2021-05-13

## 2021-05-03 NOTE — TELEPHONE ENCOUNTER
Per TR, OV with any available provider recommended. Patient scheduled for visit this afternoon with Dr. Lito Corrales.      Future Appointments   Date Time Provider Kendell Huttoni   5/3/2021  4:15 PM Margret Rodriguez MD [facility] [place] [facility]

## 2021-05-03 NOTE — TELEPHONE ENCOUNTER
wants appt- covid shot site is now hot and warm to the touch with a rash- should she come in for appt for does dr have any suggestions?

## 2021-05-03 NOTE — TELEPHONE ENCOUNTER
Last Monday patient received her first covid vaccine injection. Patient experienced the usual bruising and soreness. Patient reports having a minor abrasion on her arm beneath the actual injection site.      She states that she thinks this abrasion dereck

## 2021-05-03 NOTE — PATIENT INSTRUCTIONS
Start antibiotic - take until gone       Tylenol as needed for fever     Ibuprofen or tylenol for discomfort     Benadryl for itch

## 2021-05-07 PROBLEM — L03.114 CELLULITIS OF LEFT UPPER EXTREMITY: Status: ACTIVE | Noted: 2021-05-07

## 2021-05-07 NOTE — PROGRESS NOTES
Chief Complaint:   Patient presents with: Other: skin irritation/infection on left arm under covid vaccine injection site      HPI:   This is a 63year old female coming in for acute illness  - received vaccination. Noted redness below injection site. MG Oral Tab Take 100 mg by mouth daily. • Zinc 100 MG Oral Tab Take by mouth daily. • aspirin (ASPIR-LOW) 81 MG Oral Tab EC Take 1 tablet by mouth daily. • Ferrous Sulfate (FERROUSUL) 325 (65 Fe) MG Oral Tab Take 1 tablet by mouth daily.      • rhythm, no murmurs,   LUNGS: Clear to auscultation bilterally, no rales/rhonchi/wheezing. ABDOMEN:  Soft, nondistended, nontender, bowel sounds normal in all 4 quadrants, no masses, no hepatosplenomegaly. BACK: No tenderness,  FROM.   EXTREMITIES:  No e

## 2021-07-16 DIAGNOSIS — E78.5 DYSLIPIDEMIA: ICD-10-CM

## 2021-07-16 RX ORDER — SIMVASTATIN 10 MG
TABLET ORAL
Qty: 90 TABLET | Refills: 1 | Status: SHIPPED | OUTPATIENT
Start: 2021-07-16 | End: 2022-01-05

## 2021-07-16 NOTE — TELEPHONE ENCOUNTER
Future appt:     Last Appointment with provider:  12/2/2020; Return in about 1 year (around 12/2/2021) for Annual Physical .    Last appointment at [facility]:  5/3/2021  Cholesterol, Total (mg/dL)   Date Value   01/30/2021 192     HDL Cholesterol (mg/dL

## 2021-12-15 ENCOUNTER — OFFICE VISIT (OUTPATIENT)
Dept: FAMILY MEDICINE CLINIC | Facility: CLINIC | Age: 61
End: 2021-12-15
Payer: COMMERCIAL

## 2021-12-15 VITALS
DIASTOLIC BLOOD PRESSURE: 84 MMHG | TEMPERATURE: 98 F | WEIGHT: 161.81 LBS | SYSTOLIC BLOOD PRESSURE: 122 MMHG | BODY MASS INDEX: 30.55 KG/M2 | RESPIRATION RATE: 16 BRPM | OXYGEN SATURATION: 98 % | HEIGHT: 61 IN | HEART RATE: 96 BPM

## 2021-12-15 DIAGNOSIS — Z00.00 HEALTH CARE MAINTENANCE: Primary | ICD-10-CM

## 2021-12-15 DIAGNOSIS — M25.561 CHRONIC PAIN OF RIGHT KNEE: ICD-10-CM

## 2021-12-15 DIAGNOSIS — E78.5 DYSLIPIDEMIA: ICD-10-CM

## 2021-12-15 DIAGNOSIS — Z23 INFLUENZA VACCINE NEEDED: ICD-10-CM

## 2021-12-15 DIAGNOSIS — G89.29 CHRONIC PAIN OF RIGHT KNEE: ICD-10-CM

## 2021-12-15 PROCEDURE — 3008F BODY MASS INDEX DOCD: CPT | Performed by: NURSE PRACTITIONER

## 2021-12-15 PROCEDURE — 90686 IIV4 VACC NO PRSV 0.5 ML IM: CPT | Performed by: NURSE PRACTITIONER

## 2021-12-15 PROCEDURE — 3079F DIAST BP 80-89 MM HG: CPT | Performed by: NURSE PRACTITIONER

## 2021-12-15 PROCEDURE — 90471 IMMUNIZATION ADMIN: CPT | Performed by: NURSE PRACTITIONER

## 2021-12-15 PROCEDURE — 99396 PREV VISIT EST AGE 40-64: CPT | Performed by: NURSE PRACTITIONER

## 2021-12-15 PROCEDURE — 3074F SYST BP LT 130 MM HG: CPT | Performed by: NURSE PRACTITIONER

## 2021-12-15 NOTE — PATIENT INSTRUCTIONS
Flu shot today. Fasting labs at the end of January, last done 01/30/21. Shingles vaccine through your pharmacy.      Continue annual mammograms    covid 19 booster    Right knee pain:   Ice right knee four times a day, keep legs in neutral position when

## 2021-12-15 NOTE — PROGRESS NOTES
[facility]    HPI:     Emerson Ramos is a 63year old female who presents for an Annual Health Visit. Health maintenance. Breast cancer screening done with mammogram this prior September.   Does breast exams once a month, mot Grandfather    • Heart Disorder Paternal Grandfather    • Stroke Father         post Ortho injury      SOCIAL HISTORY:   Social History    Tobacco Use      Smoking status: Never Smoker      Smokeless tobacco: Never Used    Vaping Use      Vaping Use: Never apparent distress  SKIN: no rashes, no suspicious lesions  EYES: PERRLA, EOMI, sclera anicteric, conjunctiva normal; fundi normal, there is no nystagmus  HEENT: normocephalic; normal nose, pharynx.  TM's clear,  No bulging, no  retraction, landmarks normal. right knee four times a day, keep legs in neutral position when sitting,   Wear right knee support during the day, take off at night and when icing.   If knee pain worse, consider physical therapy          The patient indicates understanding of these issues

## 2022-01-05 DIAGNOSIS — E78.5 DYSLIPIDEMIA: ICD-10-CM

## 2022-01-05 RX ORDER — SIMVASTATIN 10 MG
TABLET ORAL
Qty: 90 TABLET | Refills: 1 | Status: SHIPPED | OUTPATIENT
Start: 2022-01-05

## 2022-01-05 NOTE — TELEPHONE ENCOUNTER
Future appt:     Your appointments     Date & Time Appointment Department [facility])    Jan 31, 2022  8:15 AM CST Laboratory Visit with [facility] ([facility])            [facility]  [facility]  [address] [facility].

## 2022-01-31 ENCOUNTER — LAB ENCOUNTER (OUTPATIENT)
Dept: LAB | Age: 62
End: 2022-01-31
Attending: NURSE PRACTITIONER
Payer: COMMERCIAL

## 2022-01-31 ENCOUNTER — TELEPHONE (OUTPATIENT)
Dept: FAMILY MEDICINE CLINIC | Facility: CLINIC | Age: 62
End: 2022-01-31

## 2022-01-31 DIAGNOSIS — Z00.00 HEALTH CARE MAINTENANCE: ICD-10-CM

## 2022-01-31 DIAGNOSIS — E78.5 DYSLIPIDEMIA: ICD-10-CM

## 2022-01-31 LAB
ALBUMIN SERPL-MCNC: 4 G/DL (ref 3.4–5)
ALBUMIN/GLOB SERPL: 1.3 {RATIO} (ref 1–2)
ALP LIVER SERPL-CCNC: 78 U/L
ALT SERPL-CCNC: 40 U/L
ANION GAP SERPL CALC-SCNC: 4 MMOL/L (ref 0–18)
AST SERPL-CCNC: 24 U/L (ref 15–37)
BASOPHILS # BLD AUTO: 0.07 X10(3) UL (ref 0–0.2)
BASOPHILS NFR BLD AUTO: 1.1 %
BILIRUB SERPL-MCNC: 0.5 MG/DL (ref 0.1–2)
BUN BLD-MCNC: 13 MG/DL (ref 7–18)
CALCIUM BLD-MCNC: 9.9 MG/DL (ref 8.5–10.1)
CHLORIDE SERPL-SCNC: 105 MMOL/L (ref 98–112)
CHOLEST SERPL-MCNC: 188 MG/DL (ref ?–200)
CO2 SERPL-SCNC: 29 MMOL/L (ref 21–32)
CREAT BLD-MCNC: 0.73 MG/DL
EOSINOPHIL # BLD AUTO: 0.16 X10(3) UL (ref 0–0.7)
EOSINOPHIL NFR BLD AUTO: 2.4 %
ERYTHROCYTE [DISTWIDTH] IN BLOOD BY AUTOMATED COUNT: 12.6 %
FASTING PATIENT LIPID ANSWER: YES
FASTING STATUS PATIENT QL REPORTED: YES
GLOBULIN PLAS-MCNC: 3.2 G/DL (ref 2.8–4.4)
GLUCOSE BLD-MCNC: 100 MG/DL (ref 70–99)
HCT VFR BLD AUTO: 46.4 %
HDLC SERPL-MCNC: 107 MG/DL (ref 40–59)
HGB BLD-MCNC: 14.9 G/DL
IMM GRANULOCYTES # BLD AUTO: 0.03 X10(3) UL (ref 0–1)
IMM GRANULOCYTES NFR BLD: 0.5 %
LDLC SERPL CALC-MCNC: 63 MG/DL (ref ?–100)
LYMPHOCYTES # BLD AUTO: 2.08 X10(3) UL (ref 1–4)
LYMPHOCYTES NFR BLD AUTO: 31.8 %
MCH RBC QN AUTO: 29.6 PG (ref 26–34)
MCHC RBC AUTO-ENTMCNC: 32.1 G/DL (ref 31–37)
MCV RBC AUTO: 92.1 FL
MONOCYTES # BLD AUTO: 0.53 X10(3) UL (ref 0.1–1)
MONOCYTES NFR BLD AUTO: 8.1 %
NEUTROPHILS # BLD AUTO: 3.67 X10(3) UL (ref 1.5–7.7)
NEUTROPHILS NFR BLD AUTO: 56.1 %
NONHDLC SERPL-MCNC: 81 MG/DL (ref ?–130)
OSMOLALITY SERPL CALC.SUM OF ELEC: 286 MOSM/KG (ref 275–295)
PLATELET # BLD AUTO: 314 10(3)UL (ref 150–450)
POTASSIUM SERPL-SCNC: 5.2 MMOL/L (ref 3.5–5.1)
PROT SERPL-MCNC: 7.2 G/DL (ref 6.4–8.2)
RBC # BLD AUTO: 5.04 X10(6)UL
SODIUM SERPL-SCNC: 138 MMOL/L (ref 136–145)
TRIGL SERPL-MCNC: 108 MG/DL (ref 30–149)
TSI SER-ACNC: 1.35 MIU/ML (ref 0.36–3.74)
VLDLC SERPL CALC-MCNC: 16 MG/DL (ref 0–30)
WBC # BLD AUTO: 6.5 X10(3) UL (ref 4–11)

## 2022-01-31 PROCEDURE — 80061 LIPID PANEL: CPT | Performed by: NURSE PRACTITIONER

## 2022-01-31 PROCEDURE — 80050 GENERAL HEALTH PANEL: CPT | Performed by: NURSE PRACTITIONER

## 2022-01-31 NOTE — TELEPHONE ENCOUNTER
----- Message from LIZZ Chung sent at 1/31/2022  5:35 PM CST -----  [employer] message sent. Metabolic panel with very minimally elevated glucose level, very minimally elevated potassium otherwise stable; notify the office if you develop any chest pain or palpitations or muscle cramping, increase your water intake the next few days and limit high potassium containing foods for the next few days. Lipid panel with wonderful HDL cholesterol and other levels are in normal ranges. Normal thyroid levels. CBC stable. Recheck labs in 1 year.

## 2022-02-01 NOTE — TELEPHONE ENCOUNTER
Can trial off the ezetimibe (Zetia) at this time though I'd like her to check her fasting lipid again in six months to ensure not worsening while off medication. Ensure following DASH/Mediterranean dietary choices to help manage cholesterol levels otherwise, increase fiber intake, routine cardiovascular activity 5 days a week for 30 minutes at a time. Lipid ordered for six months.

## 2022-02-01 NOTE — TELEPHONE ENCOUNTER
Patient notified and verbalizes understanding. Patient is asking whether she may have one of her two cholesterol medications discontinued in light of improved lipids. She was put on Simvastatin and Ezetimibe by Dr. Pau Velasco, and was hoping this would not be permanent. Please advise.

## 2022-07-03 DIAGNOSIS — E78.5 DYSLIPIDEMIA: ICD-10-CM

## 2022-07-05 RX ORDER — SIMVASTATIN 10 MG
TABLET ORAL
Qty: 90 TABLET | Refills: 1 | Status: SHIPPED | OUTPATIENT
Start: 2022-07-05

## 2022-07-05 NOTE — TELEPHONE ENCOUNTER
Future appt:     Last Appointment with provider:  12/15/2021; Return in about 1 year (around 12/15/2022) for Annual wellness. Last appointment at [facility]:  Visit date not found  Cholesterol, Total (mg/dL)   Date Value   01/31/2022 188     HDL Cholesterol (mg/dL)   Date Value   01/31/2022 107 (H)     LDL Cholesterol (mg/dL)   Date Value   01/31/2022 63     Triglycerides (mg/dL)   Date Value   01/31/2022 108     No results found for: EAG, A1C  Lab Results   Component Value Date    TSH 1.350 01/31/2022     Last RF:  1/5/2022    No follow-ups on file.

## 2022-08-13 ENCOUNTER — LABORATORY ENCOUNTER (OUTPATIENT)
Dept: LAB | Age: 62
End: 2022-08-13
Attending: NURSE PRACTITIONER
Payer: COMMERCIAL

## 2022-08-13 DIAGNOSIS — E78.5 DYSLIPIDEMIA: ICD-10-CM

## 2022-08-13 LAB
CHOLEST SERPL-MCNC: 201 MG/DL (ref ?–200)
FASTING PATIENT LIPID ANSWER: YES
HDLC SERPL-MCNC: 108 MG/DL (ref 40–59)
LDLC SERPL CALC-MCNC: 75 MG/DL (ref ?–100)
NONHDLC SERPL-MCNC: 93 MG/DL (ref ?–130)
TRIGL SERPL-MCNC: 108 MG/DL (ref 30–149)
VLDLC SERPL CALC-MCNC: 17 MG/DL (ref 0–30)

## 2022-08-13 PROCEDURE — 36415 COLL VENOUS BLD VENIPUNCTURE: CPT

## 2022-08-13 PROCEDURE — 80061 LIPID PANEL: CPT

## 2022-08-15 ENCOUNTER — TELEPHONE (OUTPATIENT)
Dept: FAMILY MEDICINE CLINIC | Facility: CLINIC | Age: 62
End: 2022-08-15

## 2022-08-15 NOTE — TELEPHONE ENCOUNTER
----- Message from LIZZ Cohen sent at 8/15/2022  7:26 AM CDT -----  [employer] message sent. Lipid panel stable off the zetia. Total cholesterol mildly out of range though likely reflective of her HDL being so good. Continue labs annually.

## 2022-12-19 ENCOUNTER — OFFICE VISIT (OUTPATIENT)
Dept: FAMILY MEDICINE CLINIC | Facility: CLINIC | Age: 62
End: 2022-12-19
Payer: COMMERCIAL

## 2022-12-19 VITALS
RESPIRATION RATE: 16 BRPM | HEART RATE: 102 BPM | TEMPERATURE: 98 F | OXYGEN SATURATION: 95 % | HEIGHT: 61 IN | SYSTOLIC BLOOD PRESSURE: 132 MMHG | WEIGHT: 160.38 LBS | BODY MASS INDEX: 30.28 KG/M2 | DIASTOLIC BLOOD PRESSURE: 88 MMHG

## 2022-12-19 DIAGNOSIS — E78.5 DYSLIPIDEMIA: ICD-10-CM

## 2022-12-19 DIAGNOSIS — R03.0 ELEVATED BLOOD PRESSURE READING WITHOUT DIAGNOSIS OF HYPERTENSION: ICD-10-CM

## 2022-12-19 DIAGNOSIS — Z01.419 WELL WOMAN EXAM WITH ROUTINE GYNECOLOGICAL EXAM: Primary | ICD-10-CM

## 2022-12-19 DIAGNOSIS — Z23 NEED FOR VACCINATION: ICD-10-CM

## 2022-12-19 PROBLEM — R07.89 ATYPICAL CHEST PAIN: Status: RESOLVED | Noted: 2020-06-12 | Resolved: 2022-12-19

## 2022-12-19 PROBLEM — Z98.890 STATUS POST BUNIONECTOMY: Status: RESOLVED | Noted: 2017-06-09 | Resolved: 2022-12-19

## 2022-12-19 PROBLEM — L03.114 CELLULITIS OF LEFT UPPER EXTREMITY: Status: RESOLVED | Noted: 2021-05-07 | Resolved: 2022-12-19

## 2022-12-19 PROCEDURE — 87624 HPV HI-RISK TYP POOLED RSLT: CPT | Performed by: NURSE PRACTITIONER

## 2022-12-19 RX ORDER — SIMVASTATIN 10 MG
10 TABLET ORAL DAILY
Qty: 90 TABLET | Refills: 1 | Status: SHIPPED | OUTPATIENT
Start: 2022-12-19

## 2022-12-19 NOTE — PATIENT INSTRUCTIONS
Fasting labs end of January    PAP today, can take 1-2 weeks for results    Monitor home blood pressure, DASH diet/Mediterranean diet, aim for 10,000 steps a day  Return in 1 month for blood pressure recheck    Consider shingles vaccine through pharmacy    Blood pressure recheck in 1 month, otherwise Physical in one year

## 2022-12-20 LAB — HPV I/H RISK 1 DNA SPEC QL NAA+PROBE: NEGATIVE

## 2023-01-14 ENCOUNTER — LABORATORY ENCOUNTER (OUTPATIENT)
Dept: LAB | Age: 63
End: 2023-01-14
Attending: FAMILY MEDICINE
Payer: COMMERCIAL

## 2023-01-14 DIAGNOSIS — Z01.419 WELL WOMAN EXAM WITH ROUTINE GYNECOLOGICAL EXAM: ICD-10-CM

## 2023-01-14 DIAGNOSIS — E78.5 DYSLIPIDEMIA: ICD-10-CM

## 2023-01-14 LAB
ALBUMIN SERPL-MCNC: 4 G/DL (ref 3.4–5)
ALBUMIN/GLOB SERPL: 1.1 {RATIO} (ref 1–2)
ALP LIVER SERPL-CCNC: 74 U/L
ALT SERPL-CCNC: 33 U/L
ANION GAP SERPL CALC-SCNC: 5 MMOL/L (ref 0–18)
AST SERPL-CCNC: 19 U/L (ref 15–37)
BASOPHILS # BLD AUTO: 0.09 X10(3) UL (ref 0–0.2)
BASOPHILS NFR BLD AUTO: 1.6 %
BILIRUB SERPL-MCNC: 0.4 MG/DL (ref 0.1–2)
BUN BLD-MCNC: 12 MG/DL (ref 7–18)
CALCIUM BLD-MCNC: 9.9 MG/DL (ref 8.5–10.1)
CHLORIDE SERPL-SCNC: 104 MMOL/L (ref 98–112)
CHOLEST SERPL-MCNC: 165 MG/DL (ref ?–200)
CO2 SERPL-SCNC: 29 MMOL/L (ref 21–32)
CREAT BLD-MCNC: 0.77 MG/DL
EOSINOPHIL # BLD AUTO: 0.2 X10(3) UL (ref 0–0.7)
EOSINOPHIL NFR BLD AUTO: 3.6 %
ERYTHROCYTE [DISTWIDTH] IN BLOOD BY AUTOMATED COUNT: 11.9 %
FASTING PATIENT LIPID ANSWER: YES
FASTING STATUS PATIENT QL REPORTED: YES
GFR SERPLBLD BASED ON 1.73 SQ M-ARVRAT: 88 ML/MIN/1.73M2 (ref 60–?)
GLOBULIN PLAS-MCNC: 3.6 G/DL (ref 2.8–4.4)
GLUCOSE BLD-MCNC: 96 MG/DL (ref 70–99)
HCT VFR BLD AUTO: 49.1 %
HDLC SERPL-MCNC: 92 MG/DL (ref 40–59)
HGB BLD-MCNC: 15.6 G/DL
IMM GRANULOCYTES # BLD AUTO: 0.03 X10(3) UL (ref 0–1)
IMM GRANULOCYTES NFR BLD: 0.5 %
LDLC SERPL CALC-MCNC: 60 MG/DL (ref ?–100)
LYMPHOCYTES # BLD AUTO: 1.95 X10(3) UL (ref 1–4)
LYMPHOCYTES NFR BLD AUTO: 34.9 %
MCH RBC QN AUTO: 29.3 PG (ref 26–34)
MCHC RBC AUTO-ENTMCNC: 31.8 G/DL (ref 31–37)
MCV RBC AUTO: 92.3 FL
MONOCYTES # BLD AUTO: 0.4 X10(3) UL (ref 0.1–1)
MONOCYTES NFR BLD AUTO: 7.2 %
NEUTROPHILS # BLD AUTO: 2.91 X10 (3) UL (ref 1.5–7.7)
NEUTROPHILS # BLD AUTO: 2.91 X10(3) UL (ref 1.5–7.7)
NEUTROPHILS NFR BLD AUTO: 52.2 %
NONHDLC SERPL-MCNC: 73 MG/DL (ref ?–130)
OSMOLALITY SERPL CALC.SUM OF ELEC: 286 MOSM/KG (ref 275–295)
PLATELET # BLD AUTO: 365 10(3)UL (ref 150–450)
POTASSIUM SERPL-SCNC: 4.5 MMOL/L (ref 3.5–5.1)
PROT SERPL-MCNC: 7.6 G/DL (ref 6.4–8.2)
RBC # BLD AUTO: 5.32 X10(6)UL
SODIUM SERPL-SCNC: 138 MMOL/L (ref 136–145)
TRIGL SERPL-MCNC: 68 MG/DL (ref 30–149)
TSI SER-ACNC: 1.14 MIU/ML (ref 0.36–3.74)
VLDLC SERPL CALC-MCNC: 10 MG/DL (ref 0–30)
WBC # BLD AUTO: 5.6 X10(3) UL (ref 4–11)

## 2023-01-14 PROCEDURE — 80061 LIPID PANEL: CPT

## 2023-01-14 PROCEDURE — 84443 ASSAY THYROID STIM HORMONE: CPT

## 2023-01-14 PROCEDURE — 85025 COMPLETE CBC W/AUTO DIFF WBC: CPT

## 2023-01-14 PROCEDURE — 80053 COMPREHEN METABOLIC PANEL: CPT

## 2023-01-14 PROCEDURE — 36415 COLL VENOUS BLD VENIPUNCTURE: CPT

## 2023-01-16 ENCOUNTER — OFFICE VISIT (OUTPATIENT)
Dept: FAMILY MEDICINE CLINIC | Facility: CLINIC | Age: 63
End: 2023-01-16
Payer: COMMERCIAL

## 2023-01-16 VITALS
HEART RATE: 100 BPM | SYSTOLIC BLOOD PRESSURE: 128 MMHG | HEIGHT: 61 IN | TEMPERATURE: 97 F | DIASTOLIC BLOOD PRESSURE: 84 MMHG | OXYGEN SATURATION: 98 % | BODY MASS INDEX: 28.96 KG/M2 | RESPIRATION RATE: 16 BRPM | WEIGHT: 153.38 LBS

## 2023-01-16 DIAGNOSIS — E78.5 DYSLIPIDEMIA: ICD-10-CM

## 2023-01-16 DIAGNOSIS — R03.0 ELEVATED BLOOD PRESSURE READING WITHOUT DIAGNOSIS OF HYPERTENSION: Primary | ICD-10-CM

## 2023-01-16 PROCEDURE — 3074F SYST BP LT 130 MM HG: CPT | Performed by: NURSE PRACTITIONER

## 2023-01-16 PROCEDURE — 3079F DIAST BP 80-89 MM HG: CPT | Performed by: NURSE PRACTITIONER

## 2023-01-16 PROCEDURE — 99213 OFFICE O/P EST LOW 20 MIN: CPT | Performed by: NURSE PRACTITIONER

## 2023-01-16 PROCEDURE — 3008F BODY MASS INDEX DOCD: CPT | Performed by: NURSE PRACTITIONER

## 2023-01-16 NOTE — PATIENT INSTRUCTIONS
Great job!!!!    Keep up with the [employer] and changes.    Keep up with walking as able    Recheck at annual physical or sooner if needed

## 2023-04-16 DIAGNOSIS — E78.5 DYSLIPIDEMIA: ICD-10-CM

## 2023-04-17 NOTE — TELEPHONE ENCOUNTER
Future appt:    Last Appointment with provider:   1/16/2023; Recheck at annual physical or sooner if needed (December)    Last appointment at [facility]:  1/16/2023  Cholesterol, Total (mg/dL)   Date Value   01/14/2023 165     HDL Cholesterol (mg/dL)   Date Value   01/14/2023 92 (H)     LDL Cholesterol (mg/dL)   Date Value   01/14/2023 60     Triglycerides (mg/dL)   Date Value   01/14/2023 68     No results found for: EAG, A1C  Lab Results   Component Value Date    TSH 1.140 01/14/2023     Last RF:  12/19/2022    No follow-ups on file.

## 2023-04-18 RX ORDER — SIMVASTATIN 10 MG
TABLET ORAL
Qty: 90 TABLET | Refills: 1 | Status: SHIPPED | OUTPATIENT
Start: 2023-04-18

## (undated) DIAGNOSIS — E78.5 DYSLIPIDEMIA: Primary | ICD-10-CM

## (undated) NOTE — LETTER
05/28/20        Davina Cruz 25      Dear Davina Mary,    Our records indicate that you have outstanding lab work and or testing that was ordered for you and has not yet been completed:  Orders Placed This Encounter      C